# Patient Record
Sex: FEMALE | Race: WHITE | NOT HISPANIC OR LATINO | URBAN - METROPOLITAN AREA
[De-identification: names, ages, dates, MRNs, and addresses within clinical notes are randomized per-mention and may not be internally consistent; named-entity substitution may affect disease eponyms.]

---

## 2017-07-28 ENCOUNTER — INPATIENT (INPATIENT)
Facility: HOSPITAL | Age: 32
LOS: 2 days | Discharge: HOME | End: 2017-07-31
Attending: OBSTETRICS & GYNECOLOGY | Admitting: OBSTETRICS & GYNECOLOGY
Payer: COMMERCIAL

## 2017-07-28 VITALS — WEIGHT: 218.26 LBS | HEIGHT: 65 IN

## 2017-07-28 DIAGNOSIS — O26.899 OTHER SPECIFIED PREGNANCY RELATED CONDITIONS, UNSPECIFIED TRIMESTER: ICD-10-CM

## 2017-07-28 DIAGNOSIS — Z3A.00 WEEKS OF GESTATION OF PREGNANCY NOT SPECIFIED: ICD-10-CM

## 2017-07-28 DIAGNOSIS — Z34.80 ENCOUNTER FOR SUPERVISION OF OTHER NORMAL PREGNANCY, UNSPECIFIED TRIMESTER: ICD-10-CM

## 2017-07-28 LAB
BASOPHILS # BLD AUTO: 0.1 K/UL — SIGNIFICANT CHANGE UP (ref 0–0.2)
BASOPHILS NFR BLD AUTO: 0.4 % — SIGNIFICANT CHANGE UP (ref 0–2)
BLD GP AB SCN SERPL QL: NEGATIVE — SIGNIFICANT CHANGE UP
EOSINOPHIL # BLD AUTO: 0.1 K/UL — SIGNIFICANT CHANGE UP (ref 0–0.5)
EOSINOPHIL NFR BLD AUTO: 0.4 % — SIGNIFICANT CHANGE UP (ref 0–6)
HCT VFR BLD CALC: 38.2 % — SIGNIFICANT CHANGE UP (ref 34.5–45)
HGB BLD-MCNC: 13.6 G/DL — SIGNIFICANT CHANGE UP (ref 11.5–15.5)
LYMPHOCYTES # BLD AUTO: 17.2 % — SIGNIFICANT CHANGE UP (ref 13–44)
LYMPHOCYTES # BLD AUTO: 2.5 K/UL — SIGNIFICANT CHANGE UP (ref 1–3.3)
MCHC RBC-ENTMCNC: 33.2 PG — SIGNIFICANT CHANGE UP (ref 27–34)
MCHC RBC-ENTMCNC: 35.5 GM/DL — SIGNIFICANT CHANGE UP (ref 32–36)
MCV RBC AUTO: 93.6 FL — SIGNIFICANT CHANGE UP (ref 80–100)
MONOCYTES # BLD AUTO: 0.7 K/UL — SIGNIFICANT CHANGE UP (ref 0–0.9)
MONOCYTES NFR BLD AUTO: 4.9 % — SIGNIFICANT CHANGE UP (ref 2–14)
NEUTROPHILS # BLD AUTO: 11.1 K/UL — HIGH (ref 1.8–7.4)
NEUTROPHILS NFR BLD AUTO: 77.1 % — HIGH (ref 43–77)
PLATELET # BLD AUTO: 191 K/UL — SIGNIFICANT CHANGE UP (ref 150–400)
RBC # BLD: 4.08 M/UL — SIGNIFICANT CHANGE UP (ref 3.8–5.2)
RBC # FLD: 12.1 % — SIGNIFICANT CHANGE UP (ref 10.3–14.5)
RH IG SCN BLD-IMP: POSITIVE — SIGNIFICANT CHANGE UP
RH IG SCN BLD-IMP: POSITIVE — SIGNIFICANT CHANGE UP
T PALLIDUM AB TITR SER: NEGATIVE — SIGNIFICANT CHANGE UP
WBC # BLD: 14.4 K/UL — HIGH (ref 3.8–10.5)
WBC # FLD AUTO: 14.4 K/UL — HIGH (ref 3.8–10.5)

## 2017-07-28 RX ORDER — ONDANSETRON 8 MG/1
4 TABLET, FILM COATED ORAL EVERY 6 HOURS
Qty: 0 | Refills: 0 | Status: DISCONTINUED | OUTPATIENT
Start: 2017-07-29 | End: 2017-07-30

## 2017-07-28 RX ORDER — SODIUM CHLORIDE 9 MG/ML
1000 INJECTION, SOLUTION INTRAVENOUS
Qty: 0 | Refills: 0 | Status: DISCONTINUED | OUTPATIENT
Start: 2017-07-28 | End: 2017-07-29

## 2017-07-28 RX ORDER — VANCOMYCIN HCL 1 G
1000 VIAL (EA) INTRAVENOUS ONCE
Qty: 0 | Refills: 0 | Status: COMPLETED | OUTPATIENT
Start: 2017-07-28 | End: 2017-07-28

## 2017-07-28 RX ORDER — CITRIC ACID/SODIUM CITRATE 300-500 MG
15 SOLUTION, ORAL ORAL EVERY 4 HOURS
Qty: 0 | Refills: 0 | Status: DISCONTINUED | OUTPATIENT
Start: 2017-07-28 | End: 2017-07-29

## 2017-07-28 RX ORDER — DEXAMETHASONE 0.5 MG/5ML
4 ELIXIR ORAL EVERY 6 HOURS
Qty: 0 | Refills: 0 | Status: DISCONTINUED | OUTPATIENT
Start: 2017-07-29 | End: 2017-07-30

## 2017-07-28 RX ORDER — VALACYCLOVIR 500 MG/1
1 TABLET, FILM COATED ORAL
Qty: 0 | Refills: 0 | COMMUNITY

## 2017-07-28 RX ORDER — SODIUM CHLORIDE 9 MG/ML
500 INJECTION, SOLUTION INTRAVENOUS ONCE
Qty: 0 | Refills: 0 | Status: COMPLETED | OUTPATIENT
Start: 2017-07-28 | End: 2017-07-28

## 2017-07-28 RX ORDER — VANCOMYCIN HCL 1 G
VIAL (EA) INTRAVENOUS
Qty: 0 | Refills: 0 | Status: DISCONTINUED | OUTPATIENT
Start: 2017-07-28 | End: 2017-07-29

## 2017-07-28 RX ORDER — NALOXONE HYDROCHLORIDE 4 MG/.1ML
0.1 SPRAY NASAL
Qty: 0 | Refills: 0 | Status: DISCONTINUED | OUTPATIENT
Start: 2017-07-29 | End: 2017-07-30

## 2017-07-28 RX ORDER — VANCOMYCIN HCL 1 G
1000 VIAL (EA) INTRAVENOUS EVERY 12 HOURS
Qty: 0 | Refills: 0 | Status: DISCONTINUED | OUTPATIENT
Start: 2017-07-28 | End: 2017-07-29

## 2017-07-28 RX ORDER — OXYTOCIN 10 UNIT/ML
4 VIAL (ML) INJECTION
Qty: 30 | Refills: 0 | Status: DISCONTINUED | OUTPATIENT
Start: 2017-07-28 | End: 2017-07-29

## 2017-07-28 RX ORDER — OXYTOCIN 10 UNIT/ML
333.33 VIAL (ML) INJECTION
Qty: 20 | Refills: 0 | Status: DISCONTINUED | OUTPATIENT
Start: 2017-07-28 | End: 2017-07-29

## 2017-07-28 RX ORDER — FENTANYL CITRATE 50 UG/ML
250 INJECTION INTRAVENOUS
Qty: 0 | Refills: 0 | Status: DISCONTINUED | OUTPATIENT
Start: 2017-07-29 | End: 2017-07-30

## 2017-07-28 RX ADMIN — SODIUM CHLORIDE 1000 MILLILITER(S): 9 INJECTION, SOLUTION INTRAVENOUS at 17:33

## 2017-07-28 RX ADMIN — Medication 4 MILLIUNIT(S)/MIN: at 17:34

## 2017-07-28 RX ADMIN — Medication 250 MILLIGRAM(S): at 12:51

## 2017-07-28 RX ADMIN — SODIUM CHLORIDE 125 MILLILITER(S): 9 INJECTION, SOLUTION INTRAVENOUS at 12:50

## 2017-07-29 LAB
HCT VFR BLD CALC: 27.8 % — LOW (ref 34.5–45)
HGB BLD-MCNC: 10 G/DL — LOW (ref 11.5–15.5)
MCHC RBC-ENTMCNC: 33.9 PG — SIGNIFICANT CHANGE UP (ref 27–34)
MCHC RBC-ENTMCNC: 36 GM/DL — SIGNIFICANT CHANGE UP (ref 32–36)
MCV RBC AUTO: 94.2 FL — SIGNIFICANT CHANGE UP (ref 80–100)
PLATELET # BLD AUTO: 126 K/UL — LOW (ref 150–400)
RBC # BLD: 2.95 M/UL — LOW (ref 3.8–5.2)
RBC # FLD: 12 % — SIGNIFICANT CHANGE UP (ref 10.3–14.5)
WBC # BLD: 12.7 K/UL — HIGH (ref 3.8–10.5)
WBC # FLD AUTO: 12.7 K/UL — HIGH (ref 3.8–10.5)

## 2017-07-29 RX ORDER — DIPHENHYDRAMINE HCL 50 MG
25 CAPSULE ORAL ONCE
Qty: 0 | Refills: 0 | Status: COMPLETED | OUTPATIENT
Start: 2017-07-29 | End: 2017-07-29

## 2017-07-29 RX ORDER — OXYCODONE HYDROCHLORIDE 5 MG/1
5 TABLET ORAL EVERY 4 HOURS
Qty: 0 | Refills: 0 | Status: COMPLETED | OUTPATIENT
Start: 2017-07-29 | End: 2017-08-05

## 2017-07-29 RX ORDER — HEPARIN SODIUM 5000 [USP'U]/ML
5000 INJECTION INTRAVENOUS; SUBCUTANEOUS EVERY 12 HOURS
Qty: 0 | Refills: 0 | Status: DISCONTINUED | OUTPATIENT
Start: 2017-07-29 | End: 2017-07-30

## 2017-07-29 RX ORDER — GLYCERIN ADULT
1 SUPPOSITORY, RECTAL RECTAL AT BEDTIME
Qty: 0 | Refills: 0 | Status: DISCONTINUED | OUTPATIENT
Start: 2017-07-30 | End: 2017-07-31

## 2017-07-29 RX ORDER — IBUPROFEN 200 MG
600 TABLET ORAL EVERY 6 HOURS
Qty: 0 | Refills: 0 | Status: COMPLETED | OUTPATIENT
Start: 2017-07-29 | End: 2018-06-27

## 2017-07-29 RX ORDER — FERROUS SULFATE 325(65) MG
325 TABLET ORAL DAILY
Qty: 0 | Refills: 0 | Status: DISCONTINUED | OUTPATIENT
Start: 2017-07-30 | End: 2017-07-31

## 2017-07-29 RX ORDER — OXYCODONE HYDROCHLORIDE 5 MG/1
5 TABLET ORAL
Qty: 0 | Refills: 0 | Status: COMPLETED | OUTPATIENT
Start: 2017-07-29 | End: 2017-08-05

## 2017-07-29 RX ORDER — OXYTOCIN 10 UNIT/ML
41.67 VIAL (ML) INJECTION
Qty: 20 | Refills: 0 | Status: DISCONTINUED | OUTPATIENT
Start: 2017-07-30 | End: 2017-07-31

## 2017-07-29 RX ORDER — KETOROLAC TROMETHAMINE 30 MG/ML
30 SYRINGE (ML) INJECTION EVERY 6 HOURS
Qty: 0 | Refills: 0 | Status: DISCONTINUED | OUTPATIENT
Start: 2017-07-29 | End: 2017-07-31

## 2017-07-29 RX ORDER — FERROUS SULFATE 325(65) MG
325 TABLET ORAL
Qty: 0 | Refills: 0 | Status: DISCONTINUED | OUTPATIENT
Start: 2017-07-29 | End: 2017-07-31

## 2017-07-29 RX ORDER — HEPARIN SODIUM 5000 [USP'U]/ML
5000 INJECTION INTRAVENOUS; SUBCUTANEOUS EVERY 12 HOURS
Qty: 0 | Refills: 0 | Status: DISCONTINUED | OUTPATIENT
Start: 2017-07-30 | End: 2017-07-31

## 2017-07-29 RX ORDER — OXYTOCIN 10 UNIT/ML
333.33 VIAL (ML) INJECTION
Qty: 20 | Refills: 0 | Status: DISCONTINUED | OUTPATIENT
Start: 2017-07-29 | End: 2017-07-31

## 2017-07-29 RX ORDER — OXYTOCIN 10 UNIT/ML
41.67 VIAL (ML) INJECTION
Qty: 20 | Refills: 0 | Status: DISCONTINUED | OUTPATIENT
Start: 2017-07-29 | End: 2017-07-31

## 2017-07-29 RX ORDER — ACETAMINOPHEN 500 MG
1000 TABLET ORAL ONCE
Qty: 0 | Refills: 0 | Status: COMPLETED | OUTPATIENT
Start: 2017-07-29 | End: 2017-07-29

## 2017-07-29 RX ORDER — ACETAMINOPHEN 500 MG
975 TABLET ORAL EVERY 6 HOURS
Qty: 0 | Refills: 0 | Status: COMPLETED | OUTPATIENT
Start: 2017-07-29 | End: 2018-06-27

## 2017-07-29 RX ORDER — ASCORBIC ACID 60 MG
250 TABLET,CHEWABLE ORAL
Qty: 0 | Refills: 0 | Status: DISCONTINUED | OUTPATIENT
Start: 2017-07-29 | End: 2017-07-31

## 2017-07-29 RX ORDER — DOCUSATE SODIUM 100 MG
100 CAPSULE ORAL
Qty: 0 | Refills: 0 | Status: DISCONTINUED | OUTPATIENT
Start: 2017-07-30 | End: 2017-07-31

## 2017-07-29 RX ORDER — SIMETHICONE 80 MG/1
80 TABLET, CHEWABLE ORAL EVERY 4 HOURS
Qty: 0 | Refills: 0 | Status: DISCONTINUED | OUTPATIENT
Start: 2017-07-30 | End: 2017-07-31

## 2017-07-29 RX ORDER — LANOLIN
1 OINTMENT (GRAM) TOPICAL
Qty: 0 | Refills: 0 | Status: DISCONTINUED | OUTPATIENT
Start: 2017-07-30 | End: 2017-07-31

## 2017-07-29 RX ORDER — SODIUM CHLORIDE 9 MG/ML
1000 INJECTION, SOLUTION INTRAVENOUS
Qty: 0 | Refills: 0 | Status: DISCONTINUED | OUTPATIENT
Start: 2017-07-29 | End: 2017-07-29

## 2017-07-29 RX ORDER — ACETAMINOPHEN 500 MG
975 TABLET ORAL EVERY 6 HOURS
Qty: 0 | Refills: 0 | Status: DISCONTINUED | OUTPATIENT
Start: 2017-07-29 | End: 2017-07-31

## 2017-07-29 RX ORDER — DIPHENHYDRAMINE HCL 50 MG
25 CAPSULE ORAL EVERY 6 HOURS
Qty: 0 | Refills: 0 | Status: DISCONTINUED | OUTPATIENT
Start: 2017-07-30 | End: 2017-07-31

## 2017-07-29 RX ORDER — TETANUS TOXOID, REDUCED DIPHTHERIA TOXOID AND ACELLULAR PERTUSSIS VACCINE, ADSORBED 5; 2.5; 8; 8; 2.5 [IU]/.5ML; [IU]/.5ML; UG/.5ML; UG/.5ML; UG/.5ML
0.5 SUSPENSION INTRAMUSCULAR ONCE
Qty: 0 | Refills: 0 | Status: DISCONTINUED | OUTPATIENT
Start: 2017-07-30 | End: 2017-07-31

## 2017-07-29 RX ORDER — SODIUM CHLORIDE 9 MG/ML
1000 INJECTION, SOLUTION INTRAVENOUS
Qty: 0 | Refills: 0 | Status: DISCONTINUED | OUTPATIENT
Start: 2017-07-30 | End: 2017-07-31

## 2017-07-29 RX ADMIN — HEPARIN SODIUM 5000 UNIT(S): 5000 INJECTION INTRAVENOUS; SUBCUTANEOUS at 17:24

## 2017-07-29 RX ADMIN — FENTANYL CITRATE 250 MILLILITER(S): 50 INJECTION INTRAVENOUS at 17:36

## 2017-07-29 RX ADMIN — Medication 1000 MILLIGRAM(S): at 01:56

## 2017-07-29 RX ADMIN — HEPARIN SODIUM 5000 UNIT(S): 5000 INJECTION INTRAVENOUS; SUBCUTANEOUS at 07:04

## 2017-07-29 RX ADMIN — ONDANSETRON 4 MILLIGRAM(S): 8 TABLET, FILM COATED ORAL at 10:46

## 2017-07-29 RX ADMIN — ONDANSETRON 4 MILLIGRAM(S): 8 TABLET, FILM COATED ORAL at 17:36

## 2017-07-29 RX ADMIN — Medication 30 MILLIGRAM(S): at 17:24

## 2017-07-29 RX ADMIN — Medication 30 MILLIGRAM(S): at 18:40

## 2017-07-29 RX ADMIN — FENTANYL CITRATE 250 MILLILITER(S): 50 INJECTION INTRAVENOUS at 00:46

## 2017-07-29 RX ADMIN — Medication 30 MILLIGRAM(S): at 07:14

## 2017-07-29 RX ADMIN — FENTANYL CITRATE 250 MILLILITER(S): 50 INJECTION INTRAVENOUS at 02:54

## 2017-07-29 RX ADMIN — Medication 325 MILLIGRAM(S): at 17:24

## 2017-07-29 RX ADMIN — Medication 30 MILLIGRAM(S): at 13:07

## 2017-07-29 RX ADMIN — Medication 250 MILLIGRAM(S): at 17:24

## 2017-07-29 RX ADMIN — Medication 25 MILLIGRAM(S): at 03:00

## 2017-07-29 RX ADMIN — Medication 400 MILLIGRAM(S): at 01:10

## 2017-07-29 RX ADMIN — Medication 30 MILLIGRAM(S): at 12:14

## 2017-07-29 RX ADMIN — Medication 975 MILLIGRAM(S): at 17:29

## 2017-07-29 RX ADMIN — Medication 975 MILLIGRAM(S): at 18:40

## 2017-07-29 RX ADMIN — Medication 125 MILLIUNIT(S)/MIN: at 01:09

## 2017-07-29 NOTE — PROGRESS NOTE ADULT - SUBJECTIVE AND OBJECTIVE BOX
Postpartum Note,  Section   32y year old woman post-operative day 1 from uncomplicated primary LTCS.  No acute events overnight.  Patient has no complaints and pain is well-controlled.  Patient is tolerating regular diet, passing flatus, ambulating, has a eckert catheter in place.  Postpartum bleeding is well controlled.    Physical exam:    Vital Signs Last 24 Hrs  T(C): 36.8 (2017 04:30), Max: 38 (2017 00:25)  T(F): 98.3 (2017 04:30), Max: 100.4 (2017 00:25)  HR: 90 (:30) (90 - 144)  BP: 105/67 (:30) (94/65 - 125/60)  BP(mean): 73 (2017 02:25) (72 - 84)  RR: 18 (:30) (18 - 22)  SpO2: 97% (:30) (94% - 97%)     @ 07:01  -   @ 07:00  --------------------------------------------------------  IN: 4706.7 mL / OUT: 2325 mL / NET: 2381.7 mL        Gen: NAD  Abdomen: Soft, nontender, no distension , firm uterine fundus at umbilicus.  Incision: Clean, dry, and intact with steri strips  Pelvic: Normal lochia noted  Ext: No calf tenderness    LABS:                        10.0   12.7  )-----------( 126      ( 2017 02:17 )             27.8                         13.6   14.4  )-----------( 191      ( 2017 13:10 )             38.2     ABO Interpretation: O ( @ 15:09)  Rh Interpretation: Positive ( @ 15:09)  ABO Interpretation: O ( @ 13:23)  Rh Interpretation: Positive ( @ 13:23)  Antibody Screen: Negative ( @ 13:23)

## 2017-07-29 NOTE — CHART NOTE - NSCHARTNOTEFT_GEN_A_CORE
PA NOTE   POD#1  LAB    Vital Signs Last 24 Hrs  T(C): 37.4 (2017 12:59), Max: 38 (2017 00:25)  T(F): 99.3 (2017 12:59), Max: 100.4 (2017 00:25)  HR: 94 (2017 12:59) (90 - 144)  BP: 101/64 (2017 12:59) (94/65 - 125/60)  BP(mean): 73 (2017 02:25) (72 - 84)  RR: 18 (2017 12:59) (16 - 22)  SpO2: 95% (2017 12:59) (94% - 97%)                          10.0   12.7  )-----------( 126      ( 2017 02:17 )             27.8           Plan:  - Ferrous Sulfate, Colace, Vitamin C supplementation.  - Repeat  CBC POD#3  - Monitor for signs/symptoms of anemia.

## 2017-07-29 NOTE — PROGRESS NOTE ADULT - PROBLEM SELECTOR PLAN 1
-Encourage Ambulation  -Continue with regular diet  -Heparin, SCDs, and ambulation for DVT ppx  -Discontinue eckert  -Check CBC  -Analgesia as needed

## 2017-07-29 NOTE — PROGRESS NOTE ADULT - SUBJECTIVE AND OBJECTIVE BOX
Day 1 of Anesthesia Pain Management Service    SUBJECTIVE: "good"  Pain Scale Score	At rest: 0	With Activity: 0	[  ] Refer to charted pain scores    THERAPY:  [ ] Epidural Bupivacaine 0.0625% and Hydromorphone 10 micrograms/mL  [ ] Epidural Ropivacaine 0.2% plain   [ x] Epidural Bupivacaine 0.01 % and Fentanyl 3 micrograms/mL  (OB)    Demand dose 3cc lockout 15(minutes) Continuous Rate 10cc/h      MEDICATIONS  (STANDING):  fentaNYL (3 MICROgram(s)/mL) + BUpivacaine 0.01% in 0.9% Sodium Chloride PCEA 250 milliLiter(s) Epidural PCA Continuous  oxytocin Infusion 333.333 milliUNIT(s)/Min (1000 mL/Hr) IV Continuous <Continuous>  oxytocin Infusion 41.667 milliUNIT(s)/Min (125 mL/Hr) IV Continuous <Continuous>  ketorolac   Injectable 30 milliGRAM(s) IV Push every 6 hours  oxyCODONE    IR 5 milliGRAM(s) Oral every 3 hours  ibuprofen  Tablet 600 milliGRAM(s) Oral every 6 hours  acetaminophen   Tablet. 975 milliGRAM(s) Oral every 6 hours  heparin  Injectable 5000 Unit(s) SubCutaneous every 12 hours    MEDICATIONS  (PRN):  fentaNYL (3 MICROgram(s)/mL) + BUpivacaine 0.01% in 0.9% Sodium Chloride PCEA Rescue Clinician Bolus 5 milliLiter(s) Epidural every 15 minutes PRN Moderate Pain (4 - 6)  naloxone Injectable 0.1 milliGRAM(s) IV Push every 3 minutes PRN For ANY of the following changes in patient status:  A. RR LESS THAN 10 breaths per minute, B. Oxygen saturation LESS THAN 90%, C. Sedation score of 6  ondansetron Injectable 4 milliGRAM(s) IV Push every 6 hours PRN Nausea  dexamethasone  Injectable 4 milliGRAM(s) IV Push every 6 hours PRN Nausea, IF ondansetron is ineffective after 30 - 60 minutes  oxyCODONE    IR 5 milliGRAM(s) Oral every 4 hours PRN Severe Pain (7 - 10)      OBJECTIVE:    Assessment of Epidural Catheter Site: 	    [x ] Dressing intact	[x ] Site non-tender	[ x] Site without erythema, discharge, edema  [x ] Epidural tubing and connection checked	[x ] Gross neurological exam within normal limits  [ ] Catheter removed – tip intact		                          10.0   12.7  )-----------( 126      ( 29 Jul 2017 02:17 )             27.8     Vital Signs Last 24 Hrs  T(C): 37.2 (07-29-17 @ 09:35), Max: 38 (07-29-17 @ 00:25)  T(F): 98.9 (07-29-17 @ 09:35), Max: 100.4 (07-29-17 @ 00:25)  HR: 90 (07-29-17 @ 04:30) (90 - 144)  BP: 105/67 (07-29-17 @ 04:30) (94/65 - 125/60)  BP(mean): 73 (07-29-17 @ 02:25) (72 - 84)  RR: 16 (07-29-17 @ 09:35) (16 - 22)  SpO2: 97% (07-29-17 @ 09:35) (94% - 97%)      Sedation Score:	[x ] Alert	[ ] Drowsy	[ ] Arousable  [ ] Asleep  [ ] Unresponsive    Side Effects:	[x ] None	[ ] Nausea	[ ] Vomiting  [ ] Pruritus  		[ ] Weakness  [ ] Numbness  [ ] Other:    ASSESSMENT/ PLAN:    Therapy to  be:	[x ] Continue   [ ] Discontinued   [ ] Change to prn Analgesics    Documentation and Verification of current medications:  [ X ] Done	[ ] Not done, not eligible, reason:    Comments:

## 2017-07-30 RX ORDER — IBUPROFEN 200 MG
600 TABLET ORAL EVERY 6 HOURS
Qty: 0 | Refills: 0 | Status: DISCONTINUED | OUTPATIENT
Start: 2017-07-30 | End: 2017-07-31

## 2017-07-30 RX ORDER — OXYCODONE HYDROCHLORIDE 5 MG/1
5 TABLET ORAL EVERY 4 HOURS
Qty: 0 | Refills: 0 | Status: DISCONTINUED | OUTPATIENT
Start: 2017-07-30 | End: 2017-07-31

## 2017-07-30 RX ORDER — OXYCODONE HYDROCHLORIDE 5 MG/1
5 TABLET ORAL
Qty: 0 | Refills: 0 | Status: DISCONTINUED | OUTPATIENT
Start: 2017-07-30 | End: 2017-07-31

## 2017-07-30 RX ADMIN — OXYCODONE HYDROCHLORIDE 5 MILLIGRAM(S): 5 TABLET ORAL at 17:07

## 2017-07-30 RX ADMIN — Medication 975 MILLIGRAM(S): at 06:39

## 2017-07-30 RX ADMIN — Medication 250 MILLIGRAM(S): at 18:32

## 2017-07-30 RX ADMIN — Medication 975 MILLIGRAM(S): at 12:10

## 2017-07-30 RX ADMIN — Medication 975 MILLIGRAM(S): at 00:54

## 2017-07-30 RX ADMIN — Medication 325 MILLIGRAM(S): at 09:13

## 2017-07-30 RX ADMIN — Medication 325 MILLIGRAM(S): at 18:32

## 2017-07-30 RX ADMIN — Medication 975 MILLIGRAM(S): at 12:40

## 2017-07-30 RX ADMIN — Medication 250 MILLIGRAM(S): at 06:39

## 2017-07-30 RX ADMIN — HEPARIN SODIUM 5000 UNIT(S): 5000 INJECTION INTRAVENOUS; SUBCUTANEOUS at 18:32

## 2017-07-30 RX ADMIN — Medication 975 MILLIGRAM(S): at 18:32

## 2017-07-30 RX ADMIN — Medication 975 MILLIGRAM(S): at 01:40

## 2017-07-30 RX ADMIN — OXYCODONE HYDROCHLORIDE 5 MILLIGRAM(S): 5 TABLET ORAL at 09:12

## 2017-07-30 RX ADMIN — Medication 325 MILLIGRAM(S): at 12:12

## 2017-07-30 RX ADMIN — OXYCODONE HYDROCHLORIDE 5 MILLIGRAM(S): 5 TABLET ORAL at 16:37

## 2017-07-30 RX ADMIN — Medication 975 MILLIGRAM(S): at 06:55

## 2017-07-30 RX ADMIN — Medication 30 MILLIGRAM(S): at 01:40

## 2017-07-30 RX ADMIN — Medication 30 MILLIGRAM(S): at 06:55

## 2017-07-30 RX ADMIN — Medication 975 MILLIGRAM(S): at 19:02

## 2017-07-30 RX ADMIN — ONDANSETRON 4 MILLIGRAM(S): 8 TABLET, FILM COATED ORAL at 06:40

## 2017-07-30 RX ADMIN — OXYCODONE HYDROCHLORIDE 5 MILLIGRAM(S): 5 TABLET ORAL at 09:42

## 2017-07-30 RX ADMIN — ONDANSETRON 4 MILLIGRAM(S): 8 TABLET, FILM COATED ORAL at 00:54

## 2017-07-30 RX ADMIN — Medication 1 TABLET(S): at 12:12

## 2017-07-30 RX ADMIN — HEPARIN SODIUM 5000 UNIT(S): 5000 INJECTION INTRAVENOUS; SUBCUTANEOUS at 06:31

## 2017-07-30 RX ADMIN — Medication 30 MILLIGRAM(S): at 00:54

## 2017-07-30 RX ADMIN — Medication 600 MILLIGRAM(S): at 18:32

## 2017-07-30 RX ADMIN — Medication 30 MILLIGRAM(S): at 06:31

## 2017-07-30 RX ADMIN — SIMETHICONE 80 MILLIGRAM(S): 80 TABLET, CHEWABLE ORAL at 20:20

## 2017-07-30 NOTE — PROGRESS NOTE ADULT - SUBJECTIVE AND OBJECTIVE BOX
Day 2 of Anesthesia Pain Management Service    SUBJECTIVE:  Pain Scale Score	At rest: ___ 	With Activity: ___ 	[ x ] Refer to charted pain scores    THERAPY:  [ ] Epidural Bupivacaine 0.0625% and Hydromorphone 10 micrograms/mL  [ ] Epidural Ropivacaine 0.2% plain   [ x] Epidural Bupivacaine 0.01 % and Fentanyl 3 micrograms/mL  (OB)    Demand dose 3cc lockout 15 minutes Continuous Rate 10cc/h      MEDICATIONS  (STANDING):  fentaNYL (3 MICROgram(s)/mL) + BUpivacaine 0.01% in 0.9% Sodium Chloride PCEA 250 milliLiter(s) Epidural PCA Continuous  oxytocin Infusion 333.333 milliUNIT(s)/Min (1000 mL/Hr) IV Continuous <Continuous>  oxytocin Infusion 41.667 milliUNIT(s)/Min (125 mL/Hr) IV Continuous <Continuous>  ketorolac   Injectable 30 milliGRAM(s) IV Push every 6 hours  ibuprofen  Tablet 600 milliGRAM(s) Oral every 6 hours  lactated ringers. 1000 milliLiter(s) (125 mL/Hr) IV Continuous <Continuous>  diphtheria/tetanus/pertussis (acellular) Vaccine (ADAcel) 0.5 milliLiter(s) IntraMuscular once  oxytocin Infusion 41.667 milliUNIT(s)/Min (125 mL/Hr) IV Continuous <Continuous>  ferrous    sulfate 325 milliGRAM(s) Oral daily  prenatal multivitamin 1 Tablet(s) Oral daily  heparin  Injectable 5000 Unit(s) SubCutaneous every 12 hours  acetaminophen   Tablet. 975 milliGRAM(s) Oral every 6 hours  ferrous    sulfate 325 milliGRAM(s) Oral two times a day with meals  ascorbic acid 250 milliGRAM(s) Oral two times a day  oxyCODONE    IR 5 milliGRAM(s) Oral every 3 hours    MEDICATIONS  (PRN):  fentaNYL (3 MICROgram(s)/mL) + BUpivacaine 0.01% in 0.9% Sodium Chloride PCEA Rescue Clinician Bolus 5 milliLiter(s) Epidural every 15 minutes PRN Moderate Pain (4 - 6)  naloxone Injectable 0.1 milliGRAM(s) IV Push every 3 minutes PRN For ANY of the following changes in patient status:  A. RR LESS THAN 10 breaths per minute, B. Oxygen saturation LESS THAN 90%, C. Sedation score of 6  ondansetron Injectable 4 milliGRAM(s) IV Push every 6 hours PRN Nausea  dexamethasone  Injectable 4 milliGRAM(s) IV Push every 6 hours PRN Nausea, IF ondansetron is ineffective after 30 - 60 minutes  simethicone 80 milliGRAM(s) Chew every 4 hours PRN Gas  diphenhydrAMINE   Capsule 25 milliGRAM(s) Oral every 6 hours PRN Itching  glycerin Suppository - Adult 1 Suppository(s) Rectal at bedtime PRN Constipation  docusate sodium 100 milliGRAM(s) Oral two times a day PRN Stool Softening  lanolin Ointment 1 Application(s) Topical every 3 hours PRN Sore Nipples  oxyCODONE    IR 5 milliGRAM(s) Oral every 4 hours PRN Severe Pain (7 - 10)      OBJECTIVE:    Assessment of Epidural Catheter Site: 	    [ ] Dressing intact	[x ] Site non-tender	[x ] Site without erythema, discharge, edema  [ ] Epidural tubing and connection checked	[x ] Gross neurological exam within normal limits  [x ] Catheter removed – tip intact		                          10.0   12.7  )-----------( 126      ( 29 Jul 2017 02:17 )             27.8     Vital Signs Last 24 Hrs  T(C): 36.7 (07-30-17 @ 08:51), Max: 37.4 (07-29-17 @ 12:59)  T(F): 98.1 (07-30-17 @ 08:51), Max: 99.3 (07-29-17 @ 12:59)  HR: 93 (07-30-17 @ 08:51) (84 - 102)  BP: 119/73 (07-30-17 @ 08:51) (101/64 - 119/73)  BP(mean): --  RR: 17 (07-30-17 @ 08:51) (17 - 18)  SpO2: 98% (07-30-17 @ 08:51) (95% - 98%)      Sedation Score:	[x ] Alert	[ ] Drowsy	[ ] Arousable  [ ] Asleep  [ ] Unresponsive    Side Effects:	[x ] None	[ ] Nausea	[ ] Vomiting  [ ] Pruritus  		[ ] Weakness  [ ] Numbness  [ ] Other:    ASSESSMENT/ PLAN:    Therapy to  be:	[ ] Continue   [x ] Discontinued   [x ] Change to prn Analgesics    Documentation and Verification of current medications:  [ X ] Done	[ ] Not done, not eligible, reason:    Comments:

## 2017-07-30 NOTE — PROGRESS NOTE ADULT - PROBLEM SELECTOR PLAN 1
- Continue with po analgesia  - Increase ambulation  - Continue regular diet  - IV lock  - No labs    Kandice Hernandez PGY-1

## 2017-07-30 NOTE — PROGRESS NOTE ADULT - SUBJECTIVE AND OBJECTIVE BOX
Patient seen and examined at bedside, no acute overnight events. No acute complaints, pain well controlled. Patient is ambulating and tolerating regular diet. Has passed flatus. Galvan is still in place. Pt is breastfeeding her baby.    Vital Signs Last 24 Hours  T(C): 36.6 (07-30-17 @ 04:54), Max: 37.4 (07-29-17 @ 12:59)  HR: 84 (07-30-17 @ 04:54) (84 - 102)  BP: 107/69 (07-30-17 @ 04:54) (101/64 - 116/71)  RR: 18 (07-30-17 @ 04:54) (16 - 18)  SpO2: 98% (07-30-17 @ 04:54) (95% - 98%)    I&O's Summary    29 Jul 2017 07:01  -  30 Jul 2017 07:00  --------------------------------------------------------  IN: 0 mL / OUT: 6400 mL / NET: -6400 mL    30 Jul 2017 07:01  -  30 Jul 2017 08:01  --------------------------------------------------------  IN: 0 mL / OUT: 600 mL / NET: -600 mL        Physical Exam:  General: NAD  Abdomen: Soft, non-tender, non-distended, fundus firm  Incision: Pfannenstiel incision CDI, subcuticular suture closure  Pelvic: Lochia wnl    Labs:    Blood Type: O Positive  RPR: Negative               10.0   12.7  )-----------( 126      ( 07-29 @ 02:17 )             27.8                13.6   14.4  )-----------( 191      ( 07-28 @ 13:10 )             38.2         MEDICATIONS  (STANDING):  fentaNYL (3 MICROgram(s)/mL) + BUpivacaine 0.01% in 0.9% Sodium Chloride PCEA 250 milliLiter(s) Epidural PCA Continuous  oxytocin Infusion 333.333 milliUNIT(s)/Min (1000 mL/Hr) IV Continuous <Continuous>  oxytocin Infusion 41.667 milliUNIT(s)/Min (125 mL/Hr) IV Continuous <Continuous>  ketorolac   Injectable 30 milliGRAM(s) IV Push every 6 hours  oxyCODONE    IR 5 milliGRAM(s) Oral every 3 hours  ibuprofen  Tablet 600 milliGRAM(s) Oral every 6 hours  lactated ringers. 1000 milliLiter(s) (125 mL/Hr) IV Continuous <Continuous>  diphtheria/tetanus/pertussis (acellular) Vaccine (ADAcel) 0.5 milliLiter(s) IntraMuscular once  oxytocin Infusion 41.667 milliUNIT(s)/Min (125 mL/Hr) IV Continuous <Continuous>  prenatal multivitamin 1 Tablet(s) Oral daily  acetaminophen   Tablet. 975 milliGRAM(s) Oral every 6 hours  heparin  Injectable 5000 Unit(s) SubCutaneous every 12 hours  ferrous    sulfate 325 milliGRAM(s) Oral two times a day with meals  ascorbic acid 250 milliGRAM(s) Oral two times a day    MEDICATIONS  (PRN):  fentaNYL (3 MICROgram(s)/mL) + BUpivacaine 0.01% in 0.9% Sodium Chloride PCEA Rescue Clinician Bolus 5 milliLiter(s) Epidural every 15 minutes PRN Moderate Pain (4 - 6)  naloxone Injectable 0.1 milliGRAM(s) IV Push every 3 minutes PRN For ANY of the following changes in patient status:  A. RR LESS THAN 10 breaths per minute, B. Oxygen saturation LESS THAN 90%, C. Sedation score of 6  ondansetron Injectable 4 milliGRAM(s) IV Push every 6 hours PRN Nausea  dexamethasone  Injectable 4 milliGRAM(s) IV Push every 6 hours PRN Nausea, IF ondansetron is ineffective after 30 - 60 minutes  oxyCODONE    IR 5 milliGRAM(s) Oral every 4 hours PRN Severe Pain (7 - 10)  simethicone 80 milliGRAM(s) Chew every 4 hours PRN Gas  diphenhydrAMINE   Capsule 25 milliGRAM(s) Oral every 6 hours PRN Itching  glycerin Suppository - Adult 1 Suppository(s) Rectal at bedtime PRN Constipation  docusate sodium 100 milliGRAM(s) Oral two times a day PRN Stool Softening  lanolin Ointment 1 Application(s) Topical every 3 hours PRN Sore Nipples

## 2017-07-31 ENCOUNTER — TRANSCRIPTION ENCOUNTER (OUTPATIENT)
Age: 32
End: 2017-07-31

## 2017-07-31 VITALS
SYSTOLIC BLOOD PRESSURE: 120 MMHG | RESPIRATION RATE: 18 BRPM | OXYGEN SATURATION: 98 % | DIASTOLIC BLOOD PRESSURE: 75 MMHG | TEMPERATURE: 98 F | HEART RATE: 95 BPM

## 2017-07-31 LAB
BASOPHILS # BLD AUTO: 0 K/UL — SIGNIFICANT CHANGE UP (ref 0–0.2)
BASOPHILS NFR BLD AUTO: 0.3 % — SIGNIFICANT CHANGE UP (ref 0–2)
EOSINOPHIL # BLD AUTO: 0.2 K/UL — SIGNIFICANT CHANGE UP (ref 0–0.5)
EOSINOPHIL NFR BLD AUTO: 1.6 % — SIGNIFICANT CHANGE UP (ref 0–6)
HCT VFR BLD CALC: 28.7 % — LOW (ref 34.5–45)
HGB BLD-MCNC: 9.2 G/DL — LOW (ref 11.5–15.5)
LYMPHOCYTES # BLD AUTO: 2.5 K/UL — SIGNIFICANT CHANGE UP (ref 1–3.3)
LYMPHOCYTES # BLD AUTO: 24.6 % — SIGNIFICANT CHANGE UP (ref 13–44)
MCHC RBC-ENTMCNC: 30.9 PG — SIGNIFICANT CHANGE UP (ref 27–34)
MCHC RBC-ENTMCNC: 32.2 GM/DL — SIGNIFICANT CHANGE UP (ref 32–36)
MCV RBC AUTO: 96.1 FL — SIGNIFICANT CHANGE UP (ref 80–100)
MONOCYTES # BLD AUTO: 0.5 K/UL — SIGNIFICANT CHANGE UP (ref 0–0.9)
MONOCYTES NFR BLD AUTO: 5.3 % — SIGNIFICANT CHANGE UP (ref 2–14)
NEUTROPHILS # BLD AUTO: 6.8 K/UL — SIGNIFICANT CHANGE UP (ref 1.8–7.4)
NEUTROPHILS NFR BLD AUTO: 68.1 % — SIGNIFICANT CHANGE UP (ref 43–77)
PLATELET # BLD AUTO: 154 K/UL — SIGNIFICANT CHANGE UP (ref 150–400)
RBC # BLD: 2.99 M/UL — LOW (ref 3.8–5.2)
RBC # FLD: 12.2 % — SIGNIFICANT CHANGE UP (ref 10.3–14.5)
WBC # BLD: 10.1 K/UL — SIGNIFICANT CHANGE UP (ref 3.8–10.5)
WBC # FLD AUTO: 10.1 K/UL — SIGNIFICANT CHANGE UP (ref 3.8–10.5)

## 2017-07-31 PROCEDURE — 59050 FETAL MONITOR W/REPORT: CPT

## 2017-07-31 PROCEDURE — 86780 TREPONEMA PALLIDUM: CPT

## 2017-07-31 PROCEDURE — 59025 FETAL NON-STRESS TEST: CPT

## 2017-07-31 PROCEDURE — G0463: CPT

## 2017-07-31 PROCEDURE — 86900 BLOOD TYPING SEROLOGIC ABO: CPT

## 2017-07-31 PROCEDURE — 86850 RBC ANTIBODY SCREEN: CPT

## 2017-07-31 PROCEDURE — 86901 BLOOD TYPING SEROLOGIC RH(D): CPT

## 2017-07-31 PROCEDURE — 85027 COMPLETE CBC AUTOMATED: CPT

## 2017-07-31 RX ORDER — IBUPROFEN 200 MG
1 TABLET ORAL
Qty: 28 | Refills: 0 | OUTPATIENT
Start: 2017-07-31 | End: 2017-08-07

## 2017-07-31 RX ORDER — OXYCODONE HYDROCHLORIDE 5 MG/1
1 TABLET ORAL
Qty: 28 | Refills: 0 | OUTPATIENT
Start: 2017-07-31 | End: 2017-08-07

## 2017-07-31 RX ORDER — ACETAMINOPHEN 500 MG
3 TABLET ORAL
Qty: 0 | Refills: 0 | COMMUNITY
Start: 2017-07-31

## 2017-07-31 RX ADMIN — Medication 600 MILLIGRAM(S): at 00:03

## 2017-07-31 RX ADMIN — Medication 250 MILLIGRAM(S): at 11:24

## 2017-07-31 RX ADMIN — Medication 325 MILLIGRAM(S): at 11:24

## 2017-07-31 RX ADMIN — Medication 600 MILLIGRAM(S): at 06:38

## 2017-07-31 RX ADMIN — Medication 975 MILLIGRAM(S): at 06:38

## 2017-07-31 RX ADMIN — HEPARIN SODIUM 5000 UNIT(S): 5000 INJECTION INTRAVENOUS; SUBCUTANEOUS at 06:38

## 2017-07-31 RX ADMIN — Medication 600 MILLIGRAM(S): at 01:05

## 2017-07-31 RX ADMIN — Medication 100 MILLIGRAM(S): at 00:03

## 2017-07-31 RX ADMIN — Medication 975 MILLIGRAM(S): at 01:05

## 2017-07-31 RX ADMIN — SIMETHICONE 80 MILLIGRAM(S): 80 TABLET, CHEWABLE ORAL at 11:23

## 2017-07-31 RX ADMIN — Medication 975 MILLIGRAM(S): at 00:05

## 2017-07-31 RX ADMIN — SIMETHICONE 80 MILLIGRAM(S): 80 TABLET, CHEWABLE ORAL at 00:03

## 2017-07-31 NOTE — DISCHARGE NOTE OB - CARE PLAN
Principal Discharge DX:	 delivery delivered  Goal:	uncomplicated postop course  Instructions for follow-up, activity and diet:	return to office in 2 weeks for incision check, 6 weeks for postpartum visit

## 2017-07-31 NOTE — DISCHARGE NOTE OB - PATIENT PORTAL LINK FT
“You can access the [facility] Patient Portal, offered by [facility], by registering with the following website: [email or web address]”

## 2017-07-31 NOTE — DISCHARGE NOTE OB - MEDICATION SUMMARY - MEDICATIONS TO TAKE
I will START or STAY ON the medications listed below when I get home from the hospital:    acetaminophen 325 mg oral tablet  -- 3 tab(s) by mouth every 6 hours  -- Indication: For pain    ibuprofen 600 mg oral tablet  -- 1 tab(s) by mouth every 6 hours, As Needed  -- Indication: For pain    oxyCODONE 5 mg oral tablet  -- 1 tab(s) by mouth every 6 hours, As Needed MDD:4  -- Indication: For pain    Valtrex 500 mg oral tablet  -- 1 tab(s) by mouth once a day  -- Indication: For home

## 2017-07-31 NOTE — DISCHARGE NOTE OB - PLAN OF CARE
uncomplicated postop course return to office in 2 weeks for incision check, 6 weeks for postpartum visit

## 2017-07-31 NOTE — PROGRESS NOTE ADULT - SUBJECTIVE AND OBJECTIVE BOX
OB Postpartum Note: Primary  Delivery, POD#3    S: 31yo POD#3 s/p pLTCS. The patient feels well.  Pain is well controlled. She is tolerating a regular diet and passing flatus. She is voiding spontaneously, and ambulating without difficulty. Denies CP/SOB. Denies lightheadedness/dizziness. Denies N/V.    O:  Vitals:  Vital Signs Last 24 Hrs  T(C): 36.8 (2017 21:44), Max: 36.8 (2017 13:29)  T(F): 98.2 (2017 21:44), Max: 98.2 (2017 13:29)  HR: 84 (2017 21:44) (84 - 94)  BP: 118/76 (2017 21:44) (104/70 - 119/73)  BP(mean): --  RR: 18 (2017 21:44) (17 - 18)  SpO2: 98% (2017 13:29) (98% - 98%)    MEDICATIONS  (STANDING):  oxytocin Infusion 333.333 milliUNIT(s)/Min (1000 mL/Hr) IV Continuous <Continuous>  oxytocin Infusion 41.667 milliUNIT(s)/Min (125 mL/Hr) IV Continuous <Continuous>  ketorolac   Injectable 30 milliGRAM(s) IV Push every 6 hours  lactated ringers. 1000 milliLiter(s) (125 mL/Hr) IV Continuous <Continuous>  diphtheria/tetanus/pertussis (acellular) Vaccine (ADAcel) 0.5 milliLiter(s) IntraMuscular once  oxytocin Infusion 41.667 milliUNIT(s)/Min (125 mL/Hr) IV Continuous <Continuous>  ferrous    sulfate 325 milliGRAM(s) Oral daily  prenatal multivitamin 1 Tablet(s) Oral daily  heparin  Injectable 5000 Unit(s) SubCutaneous every 12 hours  acetaminophen   Tablet. 975 milliGRAM(s) Oral every 6 hours  ferrous    sulfate 325 milliGRAM(s) Oral two times a day with meals  ascorbic acid 250 milliGRAM(s) Oral two times a day  oxyCODONE    IR 5 milliGRAM(s) Oral every 3 hours  ibuprofen  Tablet 600 milliGRAM(s) Oral every 6 hours    MEDICATIONS  (PRN):  simethicone 80 milliGRAM(s) Chew every 4 hours PRN Gas  diphenhydrAMINE   Capsule 25 milliGRAM(s) Oral every 6 hours PRN Itching  glycerin Suppository - Adult 1 Suppository(s) Rectal at bedtime PRN Constipation  docusate sodium 100 milliGRAM(s) Oral two times a day PRN Stool Softening  lanolin Ointment 1 Application(s) Topical every 3 hours PRN Sore Nipples  oxyCODONE    IR 5 milliGRAM(s) Oral every 4 hours PRN Severe Pain (7 - 10)      LABS:  Blood type: O Positive  Rubella IgG: RPR: Negative                          10.0<L>   12.7<H> >-----------< 126<L>    (  @ 02:17 )             27.8<L>                        13.6   14.4<H> >-----------< 191    (  @ 13:10 )             38.2                  Physical exam:  Gen: NAD  Abdomen: Soft, nontender, no distension , firm uterine fundus at umbilicus.  Incision: Clean, dry, and intact   Pelvic: Normal lochia noted  Ext: No calf tenderness          Alena Catalan PGY-1

## 2017-07-31 NOTE — DISCHARGE NOTE OB - CARE PROVIDER_API CALL
Gabriel Hensley), Obstetrics and Gynecology  [address] [place] [id number]  Phone: [phone]  Fax: [phone]

## 2023-11-07 ENCOUNTER — APPOINTMENT (OUTPATIENT)
Dept: OBGYN | Facility: CLINIC | Age: 38
End: 2023-11-07
Payer: COMMERCIAL

## 2023-11-07 PROCEDURE — 99395 PREV VISIT EST AGE 18-39: CPT

## 2024-01-25 NOTE — DISCHARGE NOTE OB - FUNCTIONAL SCREEN CURRENT LEVEL: BATHING, MLM
HR=79 bpm, ELPR=339/80 mmhg, MzT8=792.0 %, Resp=15 B/min, EtCO2=33 mmHg, Apnea=0 Seconds, Pain=0, Kai=10, Shaw=2, Comment=SR (0) independent

## 2024-05-01 ENCOUNTER — OFFICE (OUTPATIENT)
Dept: URBAN - METROPOLITAN AREA CLINIC 104 | Facility: CLINIC | Age: 39
Setting detail: OPHTHALMOLOGY
End: 2024-05-01
Payer: COMMERCIAL

## 2024-05-01 ENCOUNTER — RX ONLY (RX ONLY)
Age: 39
End: 2024-05-01

## 2024-05-01 DIAGNOSIS — L23.2: ICD-10-CM

## 2024-05-01 PROCEDURE — 92002 INTRM OPH EXAM NEW PATIENT: CPT | Performed by: OPTOMETRIST

## 2024-05-01 ASSESSMENT — CONFRONTATIONAL VISUAL FIELD TEST (CVF)
OD_FINDINGS: FULL
OS_FINDINGS: FULL

## 2024-05-01 ASSESSMENT — LID EXAM ASSESSMENTS: OS_COMMENTS: MILD ERYTHEMA RUL

## 2024-05-15 ENCOUNTER — OFFICE (OUTPATIENT)
Dept: URBAN - METROPOLITAN AREA CLINIC 104 | Facility: CLINIC | Age: 39
Setting detail: OPHTHALMOLOGY
End: 2024-05-15
Payer: COMMERCIAL

## 2024-05-15 DIAGNOSIS — L23.2: ICD-10-CM

## 2024-05-15 PROCEDURE — 99213 OFFICE O/P EST LOW 20 MIN: CPT | Performed by: OPTOMETRIST

## 2024-05-15 ASSESSMENT — CONFRONTATIONAL VISUAL FIELD TEST (CVF)
OD_FINDINGS: FULL
OS_FINDINGS: FULL

## 2024-06-26 ENCOUNTER — RX ONLY (RX ONLY)
Age: 39
End: 2024-06-26

## 2024-06-26 ENCOUNTER — OFFICE (OUTPATIENT)
Dept: URBAN - METROPOLITAN AREA CLINIC 104 | Facility: CLINIC | Age: 39
Setting detail: OPHTHALMOLOGY
End: 2024-06-26
Payer: COMMERCIAL

## 2024-06-26 DIAGNOSIS — L23.2: ICD-10-CM

## 2024-06-26 PROCEDURE — 92012 INTRM OPH EXAM EST PATIENT: CPT | Performed by: OPTOMETRIST

## 2024-06-26 ASSESSMENT — CONFRONTATIONAL VISUAL FIELD TEST (CVF)
OS_FINDINGS: FULL
OD_FINDINGS: FULL

## 2024-07-02 ENCOUNTER — OFFICE (OUTPATIENT)
Dept: URBAN - METROPOLITAN AREA CLINIC 104 | Facility: CLINIC | Age: 39
Setting detail: OPHTHALMOLOGY
End: 2024-07-02
Payer: COMMERCIAL

## 2024-07-02 DIAGNOSIS — L23.2: ICD-10-CM

## 2024-07-02 PROCEDURE — 99213 OFFICE O/P EST LOW 20 MIN: CPT | Performed by: OPTOMETRIST

## 2024-07-02 ASSESSMENT — CONFRONTATIONAL VISUAL FIELD TEST (CVF)
OD_FINDINGS: FULL
OS_FINDINGS: FULL

## 2024-07-18 ENCOUNTER — OFFICE (OUTPATIENT)
Dept: URBAN - METROPOLITAN AREA CLINIC 104 | Facility: CLINIC | Age: 39
Setting detail: OPHTHALMOLOGY
End: 2024-07-18
Payer: COMMERCIAL

## 2024-07-18 DIAGNOSIS — H00.14: ICD-10-CM

## 2024-07-18 PROCEDURE — 99213 OFFICE O/P EST LOW 20 MIN: CPT | Performed by: OPTOMETRIST

## 2024-07-18 ASSESSMENT — CONFRONTATIONAL VISUAL FIELD TEST (CVF)
OD_FINDINGS: FULL
OS_FINDINGS: FULL

## 2024-11-12 ENCOUNTER — APPOINTMENT (OUTPATIENT)
Dept: OBGYN | Facility: CLINIC | Age: 39
End: 2024-11-12
Payer: COMMERCIAL

## 2024-11-12 PROCEDURE — 99459 PELVIC EXAMINATION: CPT

## 2024-11-12 PROCEDURE — 99396 PREV VISIT EST AGE 40-64: CPT

## 2024-11-12 PROCEDURE — 96127 BRIEF EMOTIONAL/BEHAV ASSMT: CPT

## 2024-12-03 ENCOUNTER — TRANSCRIPTION ENCOUNTER (OUTPATIENT)
Age: 39
End: 2024-12-03

## 2025-01-17 ENCOUNTER — OFFICE (OUTPATIENT)
Dept: URBAN - METROPOLITAN AREA CLINIC 104 | Facility: CLINIC | Age: 40
Setting detail: OPHTHALMOLOGY
End: 2025-01-17
Payer: COMMERCIAL

## 2025-01-17 DIAGNOSIS — H00.14: ICD-10-CM

## 2025-01-17 PROCEDURE — 99213 OFFICE O/P EST LOW 20 MIN: CPT | Performed by: OPTOMETRIST

## 2025-01-17 ASSESSMENT — KERATOMETRY
OD_AXISANGLE_DEGREES: 030
OD_K2POWER_DIOPTERS: 45.25
OS_AXISANGLE_DEGREES: 164
OD_K1POWER_DIOPTERS: 44.50
OS_K2POWER_DIOPTERS: 45.25
OS_K1POWER_DIOPTERS: 44.50

## 2025-01-17 ASSESSMENT — REFRACTION_AUTOREFRACTION
OS_AXIS: 083
OD_CYLINDER: -1.50
OD_AXIS: 089
OS_CYLINDER: -2.00
OD_SPHERE: +0.25
OS_SPHERE: +0.50

## 2025-01-17 ASSESSMENT — VISUAL ACUITY
OD_BCVA: 20/20
OS_BCVA: 20/20-1

## 2025-01-17 ASSESSMENT — CONFRONTATIONAL VISUAL FIELD TEST (CVF)
OS_FINDINGS: FULL
OD_FINDINGS: FULL

## 2025-01-17 ASSESSMENT — TONOMETRY
OD_IOP_MMHG: 17
OS_IOP_MMHG: 17

## 2025-04-17 ENCOUNTER — OFFICE (OUTPATIENT)
Dept: URBAN - METROPOLITAN AREA CLINIC 104 | Facility: CLINIC | Age: 40
Setting detail: OPHTHALMOLOGY
End: 2025-04-17
Payer: COMMERCIAL

## 2025-04-17 DIAGNOSIS — H00.11: ICD-10-CM

## 2025-04-17 PROCEDURE — 99213 OFFICE O/P EST LOW 20 MIN: CPT | Performed by: OPTOMETRIST

## 2025-04-17 ASSESSMENT — REFRACTION_AUTOREFRACTION
OD_AXIS: 089
OD_CYLINDER: -1.50
OS_SPHERE: +0.50
OS_CYLINDER: -2.00
OS_AXIS: 083

## 2025-04-17 ASSESSMENT — CONFRONTATIONAL VISUAL FIELD TEST (CVF)
OD_FINDINGS: FULL
OS_FINDINGS: FULL

## 2025-04-17 ASSESSMENT — LID EXAM ASSESSMENTS: OD_EDEMA: RLL RUL 1+ 2+

## 2025-04-17 ASSESSMENT — KERATOMETRY
OD_AXISANGLE_DEGREES: 030
OS_AXISANGLE_DEGREES: 164
OS_K1POWER_DIOPTERS: 44.50
OS_K2POWER_DIOPTERS: 45.25
OD_K2POWER_DIOPTERS: 45.25
OD_K1POWER_DIOPTERS: 44.50

## 2025-04-17 ASSESSMENT — VISUAL ACUITY
OD_BCVA: 20/20
OS_BCVA: 20/20-1

## 2025-04-18 ENCOUNTER — OFFICE (OUTPATIENT)
Dept: URBAN - METROPOLITAN AREA CLINIC 104 | Facility: CLINIC | Age: 40
Setting detail: OPHTHALMOLOGY
End: 2025-04-18
Payer: COMMERCIAL

## 2025-04-18 ENCOUNTER — RX ONLY (RX ONLY)
Age: 40
End: 2025-04-18

## 2025-04-18 DIAGNOSIS — H00.11: ICD-10-CM

## 2025-04-18 PROCEDURE — 99213 OFFICE O/P EST LOW 20 MIN: CPT | Performed by: OPTOMETRIST

## 2025-04-18 ASSESSMENT — VISUAL ACUITY
OD_BCVA: 20/20
OS_BCVA: 20/20-1

## 2025-04-18 ASSESSMENT — LID EXAM ASSESSMENTS: OD_EDEMA: RLL RUL 1+ 2+

## 2025-04-18 ASSESSMENT — KERATOMETRY
OS_K2POWER_DIOPTERS: 45.25
OS_K1POWER_DIOPTERS: 44.50
OD_AXISANGLE_DEGREES: 030
OS_AXISANGLE_DEGREES: 164
OD_K1POWER_DIOPTERS: 44.50
OD_K2POWER_DIOPTERS: 45.25

## 2025-04-18 ASSESSMENT — REFRACTION_AUTOREFRACTION
OD_CYLINDER: -1.50
OS_SPHERE: +0.50
OD_AXIS: 089
OS_AXIS: 083
OS_CYLINDER: -2.00

## 2025-04-18 ASSESSMENT — CONFRONTATIONAL VISUAL FIELD TEST (CVF)
OS_FINDINGS: FULL
OD_FINDINGS: FULL

## 2025-04-18 ASSESSMENT — TONOMETRY: OD_IOP_MMHG: 16

## 2025-04-21 ENCOUNTER — OFFICE (OUTPATIENT)
Dept: URBAN - METROPOLITAN AREA CLINIC 104 | Facility: CLINIC | Age: 40
Setting detail: OPHTHALMOLOGY
End: 2025-04-21
Payer: COMMERCIAL

## 2025-04-21 DIAGNOSIS — H00.11: ICD-10-CM

## 2025-04-21 PROBLEM — B88.0 ACARIASIS, INFESTATION OF MITES AND OR TICS: Status: ACTIVE | Noted: 2025-04-17

## 2025-04-21 PROCEDURE — 92012 INTRM OPH EXAM EST PATIENT: CPT | Performed by: OPTOMETRIST

## 2025-04-21 ASSESSMENT — REFRACTION_AUTOREFRACTION
OS_CYLINDER: -2.00
OD_CYLINDER: -1.50
OS_AXIS: 083
OD_AXIS: 089
OS_SPHERE: +0.50

## 2025-04-21 ASSESSMENT — LID EXAM ASSESSMENTS: OD_EDEMA: ABSENT

## 2025-04-21 ASSESSMENT — VISUAL ACUITY
OD_BCVA: 20/20
OS_BCVA: 20/20-

## 2025-04-21 ASSESSMENT — KERATOMETRY
OS_AXISANGLE_DEGREES: 164
OS_K2POWER_DIOPTERS: 45.25
OD_K2POWER_DIOPTERS: 45.25
OD_AXISANGLE_DEGREES: 030
OD_K1POWER_DIOPTERS: 44.50
OS_K1POWER_DIOPTERS: 44.50

## 2025-04-21 ASSESSMENT — TONOMETRY
OD_IOP_MMHG: 17
OS_IOP_MMHG: 17

## 2025-04-21 ASSESSMENT — CONFRONTATIONAL VISUAL FIELD TEST (CVF)
OD_FINDINGS: FULL
OS_FINDINGS: FULL